# Patient Record
Sex: MALE | Race: WHITE | URBAN - METROPOLITAN AREA
[De-identification: names, ages, dates, MRNs, and addresses within clinical notes are randomized per-mention and may not be internally consistent; named-entity substitution may affect disease eponyms.]

---

## 2021-06-17 ENCOUNTER — OFFICE VISIT (OUTPATIENT)
Dept: ORTHOPEDIC SURGERY | Age: 21
End: 2021-06-17
Payer: COMMERCIAL

## 2021-06-17 VITALS — BODY MASS INDEX: 23.03 KG/M2 | WEIGHT: 170 LBS | HEIGHT: 72 IN

## 2021-06-17 DIAGNOSIS — M25.312 INSTABILITY OF LEFT SHOULDER JOINT: ICD-10-CM

## 2021-06-17 DIAGNOSIS — M25.511 ACUTE PAIN OF RIGHT SHOULDER: ICD-10-CM

## 2021-06-17 DIAGNOSIS — M25.512 CHRONIC LEFT SHOULDER PAIN: Primary | ICD-10-CM

## 2021-06-17 DIAGNOSIS — G89.29 CHRONIC LEFT SHOULDER PAIN: Primary | ICD-10-CM

## 2021-06-17 PROBLEM — S43.002A SUBLUXATION OF LEFT SHOULDER JOINT: Status: ACTIVE | Noted: 2018-07-10

## 2021-06-17 PROBLEM — R30.0 DYSURIA: Status: ACTIVE | Noted: 2021-03-26

## 2021-06-17 PROBLEM — L05.91 PILONIDAL CYST WITHOUT ABSCESS: Status: ACTIVE | Noted: 2019-05-22

## 2021-06-17 PROBLEM — S43.432A SUPERIOR GLENOID LABRUM LESION OF LEFT SHOULDER: Status: ACTIVE | Noted: 2018-06-14

## 2021-06-17 PROCEDURE — 99204 OFFICE O/P NEW MOD 45 MIN: CPT | Performed by: ORTHOPAEDIC SURGERY

## 2021-06-17 NOTE — PROGRESS NOTES
Date:  2021    Name:  Orlando Koch  Address:  [address] [place] [address]    :  2000      Age:   19 y.o.    SSN:  [id number]      Medical Record Number:  [id number]    Reason for Visit:    Chief Complaint    Shoulder Pain (NP BILAT SHOULDER)      DOS:2021     HPI: Orlando Koch is a 19 y.o. male here today for evaluation of bilateral shoulder. Patient states he has had 10-year history of bilateral shoulder pain without any specific injury. He was seen by an orthopedic surgeon in 2018 where an MRI was done on the left shoulder but no surgery was done. He was sent to physical therapy which he states did not seem to benefit him much. He has only taken anti-inflammatories intermittently. He states that the left shoulder bothers him most with bench pressing or pushing against a wall and the right shoulder feels stiff and bothers him only when weightlifting. Neither shoulder bothers him with activities of daily living. He denies numbness or tingling or mechanical symptoms within the shoulder. He also denies any prior dislocations      Pain Assessment  Location of Pain: Shoulder  Location Modifiers: Left, Right  Severity of Pain: 2  Quality of Pain: Sharp, Aching, Other (Comment) (TIGHTNESS)  Duration of Pain: Persistent  Frequency of Pain: Intermittent  Aggravating Factors: Other (Comment), Exercise  Limiting Behavior: Some  Relieving Factors: Rest  Result of Injury: No  Work-Related Injury: No  Are there other pain locations you wish to document?: No  ROS: All systems reviewed on patient intake form. Pertinent items are noted in HPI. Past Medical History:   Diagnosis Date    Rotator cuff tendonitis 2015    Stress fracture of foot with routine healing 2015        Past Surgical History:   Procedure Laterality Date    CLAVICLE EXCISION         History reviewed. No pertinent family history.     Social History     Socioeconomic History    Marital status: Single     Spouse name: None    Number of children: None    Years of education: None    Highest education level: None   Occupational History    None   Tobacco Use    Smoking status: Never Smoker    Smokeless tobacco: Never Used   Substance and Sexual Activity    Alcohol use: Yes    Drug use: Never    Sexual activity: None   Other Topics Concern    None   Social History Narrative    None     Social Determinants of Health     Financial Resource Strain:     Difficulty of Paying Living Expenses:    Food Insecurity:     Worried About Running Out of Food in the Last Year:     [address] in the Last Year:    Transportation Needs:     Lack of Transportation (Medical):  Lack of Transportation (Non-Medical):    Physical Activity:     Days of Exercise per Week:     Minutes of Exercise per Session:    Stress:     Feeling of Stress :    Social Connections:     Frequency of Communication with Friends and Family:     Frequency of Social Gatherings with Friends and Family:     Attends [employer] Services:     Active Member of Clubs or Organizations:     Attends Club or Organization Meetings:     Marital Status:    Intimate Partner Violence:     Fear of Current or Ex-Partner:     Emotionally Abused:     Physically Abused:     Sexually Abused:        No current outpatient medications on file. No current facility-administered medications for this visit. No Known Allergies    Vital signs:  Ht 6' (1.829 m)   Wt 170 lb (77.1 kg)   BMI 23.06 kg/m²        Neuro: Alert & oriented x 3,  normal,  no focal deficits noted. Normal affect. Eyes: sclera clear  Ears: Normal external ear  Mouth:  No perioral lesions  Pulm: Respirations unlabored and regular  Pulse: Regular rate    Skin: Warm, well perfused    Right shoulder exam    Inspection:  Held in a normal posture. Normal contour at the acromioclavicular joint. No swelling, ecchymosis, or erythema about the shoulder. No atrophy appreciated. No scapular winging. representation of bilateral shoulders. No obvious evidence of fracture or dislocation      Assessment: 70-year-old male with left shoulder posterior inferior instability and right shoulder periscapular muscle weakness. Orders Placed This Encounter   Procedures    XR SHOULDER LEFT (MIN 2 VIEWS)     Standing Status:   Future     Number of Occurrences:   1     Standing Expiration Date:   6/17/2022    XR SHOULDER RIGHT (MIN 2 VIEWS)     Standing Status:   Future     Number of Occurrences:   1     Standing Expiration Date:   6/17/2022    MRI SHOULDER LEFT WO CONTRAST     Standing Status:   Future     Standing Expiration Date:   6/17/2022     Order Specific Question:   Reason for exam:     Answer:   MRI left shoulder       Plan: We reviewed discussion with Iza Rodriguez today regarding his left shoulder posterior inferior instability. At this point would recommend MRI of the left shoulder at which point we will also send him for physical therapy to start the periscapular muscle strengthening exercises. We provided him with a physical therapy referral form and schedule him for an MRI of the left shoulder. We will have him return to the office following that MRI to review the results together. Radha Hyman is in agreement with this plan. All questions were answered to patient's satisfaction and was encouraged to call with any further questions. Total time spent for evaluation, education and development of treatment plan: 39 minutes     [place], [address] [facility]     06/17/21  11:42 AM      The encounter with Radha Hyman was supervised by Dr Roman Hernandez who personally examined the patient and reviewed the plan. This dictation was performed with a verbal recognition program (DRAGON) and it was checked for errors. It is possible that there are still dictated errors within this office note. If so, please bring any errors to my attention for an addendum.   All efforts were made to ensure that this office note is accurate.   _____________  I was physically present and personally supervised the Orthopaedic Sports Medicine Fellow in the evaluation and development of a treatment plan for this patient. I personally interviewed the patient and performed a physical examination. In addition, I discussed the patient's condition and treatment options with them. I have also reviewed and agree with the past medical, family and social history unless otherwise noted. All of the patient's questions were answered. Ayana Frias MD, PhD  6/17/2021

## 2021-08-05 ENCOUNTER — TELEPHONE (OUTPATIENT)
Dept: ORTHOPEDIC SURGERY | Age: 21
End: 2021-08-05

## 2021-09-30 ENCOUNTER — OFFICE VISIT (OUTPATIENT)
Dept: ORTHOPEDIC SURGERY | Age: 21
End: 2021-09-30
Payer: COMMERCIAL

## 2021-09-30 VITALS — HEIGHT: 72 IN | WEIGHT: 170 LBS | BODY MASS INDEX: 23.03 KG/M2

## 2021-09-30 DIAGNOSIS — M25.512 CHRONIC LEFT SHOULDER PAIN: ICD-10-CM

## 2021-09-30 DIAGNOSIS — M25.511 ACUTE PAIN OF RIGHT SHOULDER: ICD-10-CM

## 2021-09-30 DIAGNOSIS — M25.312 INSTABILITY OF LEFT SHOULDER JOINT: Primary | ICD-10-CM

## 2021-09-30 DIAGNOSIS — G89.29 CHRONIC LEFT SHOULDER PAIN: ICD-10-CM

## 2021-09-30 PROCEDURE — 99213 OFFICE O/P EST LOW 20 MIN: CPT | Performed by: ORTHOPAEDIC SURGERY

## 2021-09-30 NOTE — PROGRESS NOTES
5-/5    Special Tests:  Mild anterior pain with cross arm adduction,  Positive Speed's for distal biceps,, Negative bear hug, No Nilesh muscle deformity. Positive Yergason's, Positive Liftoff     Neurovascular: Sensation to light touch is intact, no motor deficits, palpable radial pulses 2+      Radiology:     No new XR obtained at this time. MRI Left Shoulder: 6/30/21  FINDINGS: Essentially unchanged mild impingement-related findings.       Mild/moderate infraspinatus tendinopathy.  Internal impingement-type pitting and pseudocysts of    the posterior humerus.       Biceps pulley pathology including medially perched long biceps tendon into a frayed upper    one-third subscapularis.  No SLAP lesion.       Mild swelling posterior capsule. No posterior translation or decentering.  No labral tear.       Supraspinatus tendon is intact.       Otherwise, no substantial muscle atrophy.       Teres minor tendon is intact.       No notable arthritis of the AC joint.  The acromion is unremarkable.  Coracoid is unremarkable.       No notable arthritis of the glenohumeral joint.       Remaining muscle bulk is unremarkable.       Remaining tendons are unremarkable.       No acute OCD.  No acute fracture.  No AVN.  No large loose body.       CONCLUSION:   Essentially unchanged mild impingement-related findings. Assessment :  Mr. Jeyson Klein is a pleasant, 19 y.o. patient with biceps pulley pathology. No SLAP lesion. Impression:  Encounter Diagnoses   Name Primary?  Instability of left shoulder joint Yes    Acute pain of right shoulder     Chronic left shoulder pain        Office Procedures:  No orders of the defined types were placed in this encounter. Treatment Plan: We were able to review pertinent imaging today with Jeyson Klein. We discussed diagnosis and treatment options in detail. We discussed both conservative vs surgical treatment options.  Our surgical recommendation is a scope for biceps tenodesis with possible small rotator cuff repair. We discussed recovery both with and without a rotator cuff repair. Conservatively we may proceed with an intra-articular corticosteroid injection and formal physical therapy. We discussed both of these options at length. After discussing these options, this patient has been dealing with this pain for over 4 years and is ready to move forward with an operation. He does need to discuss insurance-related questions with his dad. He can call our office when he is ready to schedule an operation. We will see Frida Suarez back pre-operatively if he decides to schedule closer to the end of this year and/or as needed. All questions were answered to patient's satisfaction and He was encouraged to call with any further questions or concerns. Rajani Montana is in agreement with this plan. 9/30/2021  4:22 PM    Sera Marquez ATC  [facility]    During this examination, I, Batool Liconalashae, functioned as a scribe for Dr. Cleo Giron. The history taking and physical examination were performed by Dr. Jayleen Arguelles. All counseling during the appointment was performed between the patient and Dr. Jayleen Arguelles. 9/30/21    ______________  I, Dr. Cleo Giron, personally performed the services described in this documentation as described by Sera Marquez ATC in my presence, and it is both accurate and complete. Ayana Arguelles MD, PhD  9/30/2021